# Patient Record
Sex: FEMALE | Race: WHITE | NOT HISPANIC OR LATINO | Employment: FULL TIME | URBAN - METROPOLITAN AREA
[De-identification: names, ages, dates, MRNs, and addresses within clinical notes are randomized per-mention and may not be internally consistent; named-entity substitution may affect disease eponyms.]

---

## 2017-05-18 ENCOUNTER — TRANSCRIBE ORDERS (OUTPATIENT)
Dept: NUTRITION | Facility: HOSPITAL | Age: 57
End: 2017-05-18

## 2017-05-18 DIAGNOSIS — E66.9 OBESITY (BMI 30-39.9): Primary | ICD-10-CM

## 2019-12-18 ENCOUNTER — TRANSCRIBE ORDERS (OUTPATIENT)
Dept: ADMINISTRATIVE | Facility: HOSPITAL | Age: 59
End: 2019-12-18

## 2019-12-18 ENCOUNTER — HOSPITAL ENCOUNTER (OUTPATIENT)
Dept: GENERAL RADIOLOGY | Facility: HOSPITAL | Age: 59
Discharge: HOME | End: 2019-12-18
Admitting: NURSE PRACTITIONER

## 2019-12-18 DIAGNOSIS — M25.562 LEFT KNEE PAIN, UNSPECIFIED CHRONICITY: Primary | ICD-10-CM

## 2019-12-18 PROCEDURE — 73562 X-RAY EXAM OF KNEE 3: CPT

## 2021-06-02 ENCOUNTER — OFFICE VISIT (OUTPATIENT)
Dept: OBSTETRICS AND GYNECOLOGY | Facility: CLINIC | Age: 61
End: 2021-06-02

## 2021-06-02 VITALS
BODY MASS INDEX: 34.36 KG/M2 | SYSTOLIC BLOOD PRESSURE: 126 MMHG | DIASTOLIC BLOOD PRESSURE: 68 MMHG | WEIGHT: 240 LBS | HEIGHT: 70 IN

## 2021-06-02 DIAGNOSIS — Z71.3 NUTRITIONAL COUNSELING: Primary | ICD-10-CM

## 2021-06-02 DIAGNOSIS — R23.2 HOT FLASHES: ICD-10-CM

## 2021-06-02 DIAGNOSIS — K59.00 CONSTIPATION, UNSPECIFIED CONSTIPATION TYPE: ICD-10-CM

## 2021-06-02 PROCEDURE — 99213 OFFICE O/P EST LOW 20 MIN: CPT | Performed by: NURSE PRACTITIONER

## 2021-06-02 RX ORDER — VORTIOXETINE 5 MG/1
5 TABLET, FILM COATED ORAL DAILY
COMMUNITY
Start: 2021-04-22

## 2021-06-02 RX ORDER — ZOLPIDEM TARTRATE 12.5 MG/1
TABLET, FILM COATED, EXTENDED RELEASE ORAL
COMMUNITY
Start: 2021-05-28

## 2021-06-02 RX ORDER — CARBAMAZEPINE 100 MG/1
TABLET, EXTENDED RELEASE ORAL
COMMUNITY
Start: 2021-03-09

## 2021-06-02 NOTE — PROGRESS NOTES
Chief Complaint   Patient presents with   • Follow-up       Subjective   HPI  Lidia Petersen is a 60 y.o. female, , who presents for almost daily hot flashes that last 2-3 minutes at a time  x4-5 weeks that come and go. Pt states she has noticed she gets them after eating wheat or dairy. Pt also c/o constipation x 4-5 weeks that is worsening, she has tried increasing water intake and taking fiber which has helped alleviate constipation. Pt also reports brain fog x 3 weeks, she states it is more of a cognitive issue than a  memory issue.     She states she has experienced this problem for 4 weeks.  She describes the severity as severe.  She states that the problem is annoying and worsening.  The patient reports additional symptoms as none.      Her last LMP was No LMP recorded..  Partner Status: Marital Status: single.      Additional OB/GYN History   Current contraception: contraceptive methods: Post menopausal status  Desires to: do not start contraception  Last Pap : 2020  Last Completed Pap Smear     This patient has no relevant Health Maintenance data.        History of abnormal Pap smear: no  Last mammogram:   Last Completed Mammogram     This patient has no relevant Health Maintenance data.        Tobacco Usage?: No   OB History        0    Para   0    Term   0       0    AB   0    Living   0       SAB   0    TAB   0    Ectopic   0    Molar   0    Multiple   0    Live Births   0                Health Maintenance   Topic Date Due   • Annual Gynecologic Pelvic and Breast Exam  Never done   • MAMMOGRAM  Never done   • COLORECTAL CANCER SCREENING  Never done   • ANNUAL PHYSICAL  Never done   • ZOSTER VACCINE (1 of 2) Never done   • HEPATITIS C SCREENING  Never done   • INFLUENZA VACCINE  2021   • TDAP/TD VACCINES (2 - Td or Tdap) 2030   • COVID-19 Vaccine  Completed   • Pneumococcal Vaccine 0-64  Aged Out       The additional following portions of the patient's history were  "reviewed and updated as appropriate: allergies, current medications, past family history, past medical history, past social history, past surgical history and problem list.    Review of Systems   Constitutional: Negative.    HENT: Negative.    Eyes: Negative.    Respiratory: Negative.    Cardiovascular: Negative.    Gastrointestinal: Positive for constipation.   Endocrine: Negative.    Genitourinary: Negative.         Hot flashes   Musculoskeletal: Negative.    Skin: Negative.    Allergic/Immunologic: Negative.    Neurological: Negative.         Cognitive    Hematological: Negative.    Psychiatric/Behavioral: Negative.        I have reviewed and agree with the HPI, ROS, and historical information as entered above. Anna Mejia, APRN    Objective   /68   Ht 177.8 cm (70\")   Wt 109 kg (240 lb)   BMI 34.44 kg/m²     Physical Exam  Constitutional:       Appearance: Normal appearance. She is obese.   Neurological:      Mental Status: She is alert.         Assessment/Plan     Assessment     Problem List Items Addressed This Visit     None      Visit Diagnoses     Nutritional counseling    -  Primary    Relevant Orders    Ambulatory Referral to Nutrition Services    Hot flashes        Constipation, unspecified constipation type              1. Hot flashes  2. Nutritional concerns      Plan: Will refer pt for nutrition education. Hot flashes could be diet related. Constipation resolves with increase fiber/ water intake.       Anna Mejia, APRN  06/02/2021    "

## 2021-06-16 ENCOUNTER — HOSPITAL ENCOUNTER (OUTPATIENT)
Dept: NUTRITION | Facility: HOSPITAL | Age: 61
Setting detail: RECURRING SERIES
Discharge: HOME | End: 2021-06-16

## 2021-06-16 VITALS — WEIGHT: 240 LBS | HEIGHT: 70 IN | BODY MASS INDEX: 34.36 KG/M2

## 2021-06-16 PROCEDURE — 97802 MEDICAL NUTRITION INDIV IN: CPT | Performed by: DIETITIAN, REGISTERED

## 2021-06-16 NOTE — CONSULTS
Adult Outpatient Nutrition  Assessment/PES    Patient Name:  Lidia Petersen  YOB: 1960  MRN: [id number]    Assessment Date:  6/16/2021    Telehealth nutrition consult, 60 minutes. This medical referred consult was provided as a tele-health or e-visit, as patient is unable to attend an in-office appointment due to the COVID-19 crisis. Consent for treatment was given verbally.    Comments: Patient is present for nutrition counseling related to weight loss.Other medical history reported includes depression and cholecystectomy. Patient describes problems with having a variety of healthy breakfast options. She currently is eating a bag of ghardettos with a diet pepsi in the morning, and feels if she had healthier options she would be more consistent with healthy choices the remainder of the day. Dislikes eggs and carpio, reports intolerances to wheat and dairy, and does not consume much soy. Also states she has lost preference to meat over the years. Patient often will eat out for her dinner meal (mix of fast food and sit down restaurants). Overall appears to like a good variety of foods. For exercise, patient does have a gym membership. Reports her challenge is staying in a routine with going to the gym. When in a routine, reports she will exercise 5 times per week. Patient wants to obtain information on breakfast options and healthy eating strategies for weight loss. Most recent weight from referral is 109 kg (240 lbs). Patient has no barriers to learning. Health literacy assessment not completed today.     The instructional process includes the plate method, nutrition label reading, meal planning, portions, and exercise. Discussed evidence based weight loss approaches, such as Mediterranean diet, DASH diet, plant-forward eating as opposed to the fad diet trends. Discussed a regular eating pattern, which patient is consuming 3 meals per day and does not snack very often. Spent time discussing various  breakfast ideas and how to incorporate protein in the morning. Patient is aware of the macronutrients and the importance of consuming a balance of the 3. She is open to yogurt parfaits (using non-dairy), oatmeal w fruit and nuts, toast w avocado and seeds (uses tay and flax), homemade smoothies w protein powder, pre-made shakes (suggested OWYN and [employer] vegan), protein balls, etc. Will send handouts with ideas and smoothie options. Patient also may do a tofu scramble occasionally. Patient questions how much fruit to consume due to carbohydrate content. RD encouraged 2-3 servings per day in addition to 3+ servings of vegetables. Reviewed the plate method as a guide for increasing fruit and vegetable consumption. For exercise, we discussed CDC recommendation of 150 minutes of moderate/vigorous activity per week, incorporating strength training 2-3 days per week.     Consent given to e-mail and mail materials, including [employer] Weight Loss Toolkit, 1,500 kcal meal plan and sample menu, and supporting nutrition education materials.     Goals:  1. Incorporate healthier breakfast options.   2. Add in physical activity.  3. Gradual weight loss.     Total of 60 minutes spent with patient on nutrition counseling. Patient appears motivated to work on goals set and is very engaged throughout the session.  Education based on Academy of Dietetics and Nutrition guidelines. Patient was provided with RD's contact information. Follow up visit is scheduled for 7/14 at 2:00 p.m. Thank you for this referral.    General Info     Row Name 06/16/21 9379       Today's Session    Person(s) attending today's session  Patient     Services Used Today?  No       General Information    How Well Do You Speak English?  very well    Do You Speak a Language Other Than English at Home?  no    Preferred Language  English    Are you able to read and write English?  Yes    Is patient pregnant?  n/a          Anthropometrics     Row  "Name 06/16/21 1616          Anthropometrics    Height  177.8 cm (70\")     Weight  109 kg (240 lb)        Ideal Body Weight (IBW)    Ideal Body Weight (IBW) (kg)  68.72     % Ideal Body Weight  158.42        Body Mass Index (BMI)    BMI (kg/m2)  34.51         Nutritional Info/Activity     Row Name 06/16/21 1504       Nutritional Information    Have you had weight changes?  No    What is your motivation to lose weight?  Health    Food Allergies  other (see comments) dairy, gluten intolerances, limits soy    Supplemental Drinks/Foods/Additives  None    History of eating disorder?  Compulsive eating    What cultural diet influences are important for you to follow?  None    Do you have difficulty chewing food?  No    Functional Status  able to prepare meals;able to purchase food;ambulatory    List any food cravings/trigger foods you have  carbs    How often during the day do you find yourself snacking?  0-1 time/day    How often do you eat out and where?  1 time/day during the week - fast food and sit down    Do you use Food Assistance programs (WIC, food stamps, food bank)?  no    Do you need information about Food Assistance programs?  no    How many times do you drink milk per day?  0    How many times do you eat fruit per day?  1    How many times do you eat vegetables per day?  1    How many times do you drink juice per day?  0    How many times do you eat candy/chocolates per day?  0    How many times do you eat baked goods per day?  0    How many times do you eat desserts per day?  0    How many times do you eat ice cream per day?  0    How many times do you eat snack foods per day?  0    How many diet sodas do you drink per day?  1    How many regular sodas do you drink per day?  0    How many times do you eat ethnic food per day?  0    How many times do you drink alcohol per day?  0    How many times do you have caffeine per day?  1    How many servings of artificial sweetner do you have per day?  1    How many " "meals do you eat each day?  3    How many snacks do you eat each day?  0    What is the biggest challenge you have with your diet?  Other (comment) Hungry in the AM but don't like eggs and carpio    Enter everything you can remember eating in the last 24 hours (1 day)  Breakfast: bag of ghardettos, diet pepsi; AM snack: Handful of peanuts; Dinner: Jeanie-jessie chicken, 2 spring rolls, 1 cookie       Physical Activity    Are you currently involved in an activity/exercise program?   No        Home Nutrition Report     Row Name 06/16/21 1504          Home Nutrition Report    Supplemental Drinks/Foods/Additives  None         Estimated/Assessed Needs     Row Name 06/16/21 1616          Calculation Measurements    Weight Used For Calculations  109 kg (240 lb)     Height  177.8 cm (70\")        Estimated/Assessed Needs    Additional Documentation  Navarre-St. Jeor Equation (Group)        Navarre-St. Jeor Equation    RMR (Navarre-St. Jeor Equation)  1728.88     Navarre-St. Jeor Activity Factors  1.2     Activity Factors (Navarre-St. Jeor)  7149.658                 Problem/Interventions:  Problem 1     Row Name 06/16/21 1620          Nutrition Diagnoses Problem 1    Problem 1  Overweight/Obesity     Etiology (related to)  Factors Affecting Nutrition     Food Habit/Preferences  Other overconsumption of calories over time, limited fruit and vegetable intake     Signs/Symptoms (evidenced by)  BMI     BMI  30 - 34.9                 Intervention Goal     Row Name 06/16/21 1621          Intervention Goal    General  Meet nutritional needs for age/condition     PO  Meet estimated needs     Weight  Appropriate weight loss           Nutrition Prescription     Row Name 06/16/21 1621          Nutrition Prescription PO    PO Prescription  Begin/change diet     Begin/Change Diet to  Regular     Fluid Consistency  Thin     New PO Prescription Ordered?  No, recommended         Education/Evaluation     Row Name 06/16/21 1621          Education    " Education  Education topics;Advised regarding habits/behavior;Provided education regarding     Provided education regarding  Nutrition for age     Education Topics  Basic nutrition;Gradual weight loss     Advised Regarding Habits/Behavior  Food choices;Activity;Eating pattern;Meal planning        Monitor/Evaluation    Monitor  Per protocol     Education Follow-up  Other (comment) 7/14 at 2:00 pm           Electronically signed by:  Kait Oropeza RD  06/16/21 16:23 EDT

## 2021-07-14 ENCOUNTER — APPOINTMENT (OUTPATIENT)
Dept: NUTRITION | Facility: HOSPITAL | Age: 61
End: 2021-07-14

## 2022-07-26 ENCOUNTER — TRANSCRIBE ORDERS (OUTPATIENT)
Dept: ADMINISTRATIVE | Facility: HOSPITAL | Age: 62
End: 2022-07-26

## 2022-07-26 DIAGNOSIS — Z12.31 VISIT FOR SCREENING MAMMOGRAM: Primary | ICD-10-CM

## 2022-08-22 ENCOUNTER — HOSPITAL ENCOUNTER (OUTPATIENT)
Dept: MAMMOGRAPHY | Facility: HOSPITAL | Age: 62
Discharge: HOME | End: 2022-08-22
Admitting: FAMILY MEDICINE

## 2022-08-22 DIAGNOSIS — Z12.31 VISIT FOR SCREENING MAMMOGRAM: ICD-10-CM

## 2022-08-22 PROCEDURE — 77063 BREAST TOMOSYNTHESIS BI: CPT | Performed by: RADIOLOGY

## 2022-08-22 PROCEDURE — 77067 SCR MAMMO BI INCL CAD: CPT | Performed by: RADIOLOGY

## 2022-08-22 PROCEDURE — 77063 BREAST TOMOSYNTHESIS BI: CPT

## 2022-08-22 PROCEDURE — 77067 SCR MAMMO BI INCL CAD: CPT

## 2022-09-23 ENCOUNTER — HOSPITAL ENCOUNTER (OUTPATIENT)
Dept: MAMMOGRAPHY | Facility: HOSPITAL | Age: 62
Discharge: HOME | End: 2022-09-23
Admitting: RADIOLOGY

## 2022-09-23 DIAGNOSIS — R92.8 ABNORMAL MAMMOGRAM: ICD-10-CM

## 2022-09-23 PROCEDURE — 77066 DX MAMMO INCL CAD BI: CPT

## 2022-09-23 PROCEDURE — 77062 BREAST TOMOSYNTHESIS BI: CPT | Performed by: RADIOLOGY

## 2022-09-23 PROCEDURE — G0279 TOMOSYNTHESIS, MAMMO: HCPCS

## 2022-09-23 PROCEDURE — 77066 DX MAMMO INCL CAD BI: CPT | Performed by: RADIOLOGY

## 2023-05-18 ENCOUNTER — TRANSCRIBE ORDERS (OUTPATIENT)
Dept: ADMINISTRATIVE | Facility: HOSPITAL | Age: 63
End: 2023-05-18
Payer: COMMERCIAL

## 2023-05-18 ENCOUNTER — HOSPITAL ENCOUNTER (OUTPATIENT)
Dept: GENERAL RADIOLOGY | Facility: HOSPITAL | Age: 63
Discharge: HOME | End: 2023-05-18
Payer: COMMERCIAL

## 2023-05-18 DIAGNOSIS — S80.02XA CONTUSION OF LEFT KNEE, INITIAL ENCOUNTER: ICD-10-CM

## 2023-05-18 DIAGNOSIS — S80.02XA CONTUSION OF LEFT KNEE, INITIAL ENCOUNTER: Primary | ICD-10-CM

## 2023-05-18 PROCEDURE — 73590 X-RAY EXAM OF LOWER LEG: CPT

## 2023-09-11 NOTE — PROGRESS NOTES
Chief Complaint  Sleeping Problem    Subjective     History of Present Illness:  Lidia Petersen is a 62 y.o. female with a history of ADHD, depression.  Patient is referred by Clyde Valladares.  She reports difficulty falling and staying asleep and decreased libido.  Her symptoms have been ongoing for more than 5 years.  Patient typically goes to bed at midnight waking at 7:15 AM on weekdays and 9:30 AM on weekends.  She estimates an average of 6-1/2 hours of sleep per night and it takes approximately 1 hour for her to get to sleep.  She does not take naps.  Patient is a former smoker, denies use of alcohol, and formerly used marijuana.  She drinks 120 ounce soda daily. She notes her insomnia has been ongoing for her entire life. She has been diagnosed with ADHD, and depression. She has been on Ambien for many years. She wants to discontinue medication to help with sleep. She has a stressful job.    Further details are as follows:    [place] Scale is (out of 24): Total score: 2     Estimated average amount of sleep per night: 6-1/2 hours  Number of times she wakes up at night: 0-1   Difficulty falling back asleep: occasionally  It usually takes 60 minutes to go to sleep.  She feels sleepy upon waking up: yes  Rotating or night shift work: no    Drowsiness/Sleepiness:  She exhibits the following:  excessive daytime sleepiness  excessive daytime fatigue    Snoring/Breathing:  She exhibits the following:  awakens with dry mouth    Head Injury:  She exhibits the following:  No    Reflux:  She describes the following:  none    Narcolepsy:  She exhibits the following:  none    RLS/PLMs:  She describes the following:  Kicks at times    Insomnia:  She describes the following:  problems initiating sleep at night    Parasomnia:  She exhibits the following:  sleep talks    Weight:       09/13/23  0837   Weight: 104 kg (229 lb 12.8 oz)      Weight change in the last year:  loss: 16 lbs    The patient's relevant past medical,  surgical, family, and social history reviewed and updated in Epic as appropriate.    Review of Systems   Constitutional:  Positive for appetite change.   HENT:  Positive for voice change.    Eyes: Negative.    Respiratory: Negative.     Cardiovascular: Negative.    Gastrointestinal:  Positive for constipation.   Endocrine: Negative.    Genitourinary: Negative.    Musculoskeletal: Negative.    Skin: Negative.    Allergic/Immunologic: Negative.    Neurological: Negative.    Hematological: Negative.    Psychiatric/Behavioral:  Positive for sleep disturbance and positive for hyperactivity.    All other systems reviewed and are negative.    PMH:    Past Medical History:   Diagnosis Date    Depression      Past Surgical History:   Procedure Laterality Date    BREAST BIOPSY Left     CHOLECYSTECTOMY      HERNIA REPAIR      x2     OB History          0    Para   0    Term   0       0    AB   0    Living   0         SAB   0    IAB   0    Ectopic   0    Molar   0    Multiple   0    Live Births   0              Allergies   Allergen Reactions    Sulfa Antibiotics Other (See Comments)     Does not remember     Penicillins Rash       MEDS:  Prior to Admission medications    Medication Sig Start Date End Date Taking? Authorizing Provider   carBAMazepine XR (TEGretol  XR) 100 MG 12 hr tablet  3/9/21   Vicki Brooke MD   Trintellix 5 MG tablet Take 5 mg by mouth Daily. 21   Vicki Brooke MD   zolpidem CR (AMBIEN CR) 12.5 MG CR tablet TAKE 1 TABLET BY MOUTH EVERY DAY AS NEEDED FOR INSOMNIA 21   Vicki Brooke MD       FH:  Family History   Problem Relation Age of Onset    Kidney disease Mother     Brain cancer Father     Brain cancer Maternal Aunt         lung    Brain cancer Paternal Aunt         breast    Skin cancer Cousin     Breast cancer Neg Hx     Ovarian cancer Neg Hx        Objective   Vital Signs:  /72 (BP Location: Right arm, Patient Position: Sitting, Cuff Size: Adult)   " Pulse 70   Ht 177.8 cm (70\")   Wt 104 kg (229 lb 12.8 oz)   SpO2 98%   BMI 32.97 kg/m²     BMI cannot be calculated due to outdated height or weight values.  Please input a current height/weight in Vitals and re-renter BMIFOLLOWUP in Note to pull in correct documentation based on BMI range.        Physical Exam  Vitals reviewed.   Constitutional:       Appearance: Normal appearance.   HENT:      Head: Normocephalic and atraumatic.      Nose: Nose normal.      Mouth/Throat:      Mouth: Mucous membranes are moist.   Cardiovascular:      Rate and Rhythm: Normal rate and regular rhythm.      Heart sounds: No murmur heard.    No friction rub. No gallop.   Pulmonary:      Effort: Pulmonary effort is normal. No respiratory distress.      Breath sounds: Normal breath sounds. No wheezing or rhonchi.   Neurological:      Mental Status: She is alert and oriented to person, place, and time.   Psychiatric:         Behavior: Behavior normal.       Mallampati Score: III (soft and hard palate and base of uvula visible)    Result Review :              Assessment and Plan  Lidia Petersen is a very pleasant 62 y.o. female with a past medical history of ADHD, and depression who presents for further evaluation of psychophysiologic insomnia.  The patient has been prescribed Ambien for quite some time and wishes to try and discontinue this.  She has had difficulty with insomnia much of her life and notes that she was recently diagnosed with ADHD and has struggled with depression.  She also has quite a bit of stress at work at the present time.  Certainly, these processes can also affect insomnia.  Bupropion was started and seems to be helping with her ADHD.  I have discussed strategies including sleep hygiene, and CBT-I which is the standard of care for treatment of insomnia.  I have given patient information on the [facility] online program and will refer her to Dr. Coto for CBT-I evaluation and treatment.  I have also " discussed extended release melatonin which may be of benefit to her.  We will see her back in approximately 5 months for follow-up.      Diagnoses and all orders for this visit:    1. Psychophysiological insomnia (Primary)  -     Ambulatory Referral to Behavioral Health    2. Obesity (BMI 30.0-34.9)                 I discussed the consequences of uncontrolled sleep apnea including hypertension, heart disease, diabetes, stroke, and dementia. I further discussed sleep apnea therapeutic options including CPAP, Weight loss, Oral dental appliance, and surgery.         Follow Up  Return in about 5 months (around 2/13/2024) for Recheck insomnia after CBT-I with Dr. Coto.  Patient was given instructions and counseling regarding her condition or for health maintenance advice. Please see specific information pulled into the AVS if appropriate.     SONIDO Davalos, ACNP-BC  Pulmonology, Critical Care, and Sleep Medicine

## 2023-09-13 ENCOUNTER — OFFICE VISIT (OUTPATIENT)
Dept: SLEEP MEDICINE | Facility: HOSPITAL | Age: 63
End: 2023-09-13
Payer: COMMERCIAL

## 2023-09-13 VITALS
OXYGEN SATURATION: 98 % | WEIGHT: 229.8 LBS | HEIGHT: 70 IN | SYSTOLIC BLOOD PRESSURE: 108 MMHG | DIASTOLIC BLOOD PRESSURE: 72 MMHG | BODY MASS INDEX: 32.9 KG/M2 | HEART RATE: 70 BPM

## 2023-09-13 DIAGNOSIS — F51.04 PSYCHOPHYSIOLOGICAL INSOMNIA: Primary | ICD-10-CM

## 2023-09-13 DIAGNOSIS — E66.9 OBESITY (BMI 30.0-34.9): ICD-10-CM

## 2023-09-13 RX ORDER — BUPROPION HYDROCHLORIDE 150 MG/1
150 TABLET, EXTENDED RELEASE ORAL DAILY
COMMUNITY

## 2025-02-05 ENCOUNTER — TRANSCRIBE ORDERS (OUTPATIENT)
Dept: GENERAL RADIOLOGY | Facility: HOSPITAL | Age: 65
End: 2025-02-05
Payer: COMMERCIAL

## 2025-02-05 ENCOUNTER — HOSPITAL ENCOUNTER (OUTPATIENT)
Dept: GENERAL RADIOLOGY | Facility: HOSPITAL | Age: 65
Discharge: HOME | End: 2025-02-05
Admitting: NURSE PRACTITIONER
Payer: COMMERCIAL

## 2025-02-05 DIAGNOSIS — S29.019S THORACIC MYOFASCIAL STRAIN, SEQUELA: Primary | ICD-10-CM

## 2025-02-05 DIAGNOSIS — S29.019S THORACIC MYOFASCIAL STRAIN, SEQUELA: ICD-10-CM

## 2025-02-05 DIAGNOSIS — S69.91XA INJURY, HAND, RIGHT, INITIAL ENCOUNTER: ICD-10-CM

## 2025-02-05 PROCEDURE — 73130 X-RAY EXAM OF HAND: CPT

## 2025-02-05 PROCEDURE — 71101 X-RAY EXAM UNILAT RIBS/CHEST: CPT
